# Patient Record
Sex: FEMALE | Race: BLACK OR AFRICAN AMERICAN | NOT HISPANIC OR LATINO | Employment: UNEMPLOYED | URBAN - METROPOLITAN AREA
[De-identification: names, ages, dates, MRNs, and addresses within clinical notes are randomized per-mention and may not be internally consistent; named-entity substitution may affect disease eponyms.]

---

## 2017-07-31 ENCOUNTER — PATIENT OUTREACH (OUTPATIENT)
Dept: ADMINISTRATIVE | Facility: HOSPITAL | Age: 52
End: 2017-07-31

## 2017-07-31 NOTE — PROGRESS NOTES
No letter will be mailed to the patient. The patient has moved to [place]. A release of information was signed by the patient on 01/21/16 releasing her records to the [facility].    The patient is due for a diabetes and hypertension follow up and fasting blood work w/ urine. The patient is also due for immunizations(pneumonia and tetanus), pap smear, colonoscopy, and a diabetic eye and foot exam.